# Patient Record
Sex: FEMALE | Race: WHITE | NOT HISPANIC OR LATINO | Employment: UNEMPLOYED | URBAN - METROPOLITAN AREA
[De-identification: names, ages, dates, MRNs, and addresses within clinical notes are randomized per-mention and may not be internally consistent; named-entity substitution may affect disease eponyms.]

---

## 2022-04-07 ENCOUNTER — TELEPHONE (OUTPATIENT)
Dept: OBSTETRICS AND GYNECOLOGY | Facility: CLINIC | Age: 27
End: 2022-04-07
Payer: MEDICAID

## 2022-04-07 NOTE — TELEPHONE ENCOUNTER
Spoke with patient and informed her that we are not accepting new OBs due to Dr. Casillas moving in June, patient verbalized understanding and states she will call around.   Never smoker

## 2022-05-09 ENCOUNTER — OFFICE VISIT (OUTPATIENT)
Dept: OBSTETRICS AND GYNECOLOGY | Facility: CLINIC | Age: 27
End: 2022-05-09
Payer: MEDICAID

## 2022-05-09 VITALS
SYSTOLIC BLOOD PRESSURE: 124 MMHG | BODY MASS INDEX: 16.63 KG/M2 | DIASTOLIC BLOOD PRESSURE: 80 MMHG | WEIGHT: 99.81 LBS | HEART RATE: 88 BPM | HEIGHT: 65 IN

## 2022-05-09 DIAGNOSIS — Z32.01 POSITIVE PREGNANCY TEST: ICD-10-CM

## 2022-05-09 DIAGNOSIS — Z11.3 SCREENING EXAMINATION FOR SEXUALLY TRANSMITTED DISEASE: ICD-10-CM

## 2022-05-09 DIAGNOSIS — N92.6 LATE MENSES: Primary | ICD-10-CM

## 2022-05-09 LAB
B-HCG UR QL: POSITIVE
CTP QC/QA: YES

## 2022-05-09 PROCEDURE — 1160F RVW MEDS BY RX/DR IN RCRD: CPT | Mod: CPTII,S$GLB,, | Performed by: NURSE PRACTITIONER

## 2022-05-09 PROCEDURE — 3008F PR BODY MASS INDEX (BMI) DOCUMENTED: ICD-10-PCS | Mod: CPTII,S$GLB,, | Performed by: NURSE PRACTITIONER

## 2022-05-09 PROCEDURE — 81025 URINE PREGNANCY TEST: CPT | Mod: S$GLB,,, | Performed by: NURSE PRACTITIONER

## 2022-05-09 PROCEDURE — 3079F DIAST BP 80-89 MM HG: CPT | Mod: CPTII,S$GLB,, | Performed by: NURSE PRACTITIONER

## 2022-05-09 PROCEDURE — 3074F SYST BP LT 130 MM HG: CPT | Mod: CPTII,S$GLB,, | Performed by: NURSE PRACTITIONER

## 2022-05-09 PROCEDURE — 3079F PR MOST RECENT DIASTOLIC BLOOD PRESSURE 80-89 MM HG: ICD-10-PCS | Mod: CPTII,S$GLB,, | Performed by: NURSE PRACTITIONER

## 2022-05-09 PROCEDURE — 99213 OFFICE O/P EST LOW 20 MIN: CPT | Mod: 25,TH,S$GLB, | Performed by: NURSE PRACTITIONER

## 2022-05-09 PROCEDURE — 3074F PR MOST RECENT SYSTOLIC BLOOD PRESSURE < 130 MM HG: ICD-10-PCS | Mod: CPTII,S$GLB,, | Performed by: NURSE PRACTITIONER

## 2022-05-09 PROCEDURE — 3008F BODY MASS INDEX DOCD: CPT | Mod: CPTII,S$GLB,, | Performed by: NURSE PRACTITIONER

## 2022-05-09 PROCEDURE — 81025 POCT URINE PREGNANCY: ICD-10-PCS | Mod: S$GLB,,, | Performed by: NURSE PRACTITIONER

## 2022-05-09 PROCEDURE — 99213 PR OFFICE/OUTPT VISIT, EST, LEVL III, 20-29 MIN: ICD-10-PCS | Mod: 25,TH,S$GLB, | Performed by: NURSE PRACTITIONER

## 2022-05-09 PROCEDURE — 1159F PR MEDICATION LIST DOCUMENTED IN MEDICAL RECORD: ICD-10-PCS | Mod: CPTII,S$GLB,, | Performed by: NURSE PRACTITIONER

## 2022-05-09 PROCEDURE — 1160F PR REVIEW ALL MEDS BY PRESCRIBER/CLIN PHARMACIST DOCUMENTED: ICD-10-PCS | Mod: CPTII,S$GLB,, | Performed by: NURSE PRACTITIONER

## 2022-05-09 PROCEDURE — 1159F MED LIST DOCD IN RCRD: CPT | Mod: CPTII,S$GLB,, | Performed by: NURSE PRACTITIONER

## 2022-05-09 RX ORDER — PNV 112/IRON/FOLIC/OM3/DHA/EPA 3.33-.33MG
3 TABLET,CHEWABLE ORAL DAILY
Qty: 90 TABLET | Refills: 11 | Status: SHIPPED | OUTPATIENT
Start: 2022-05-09 | End: 2022-06-07 | Stop reason: SDUPTHER

## 2022-05-09 NOTE — PROGRESS NOTES
"    Subjective:       Patient ID: AGATA Branch is a 27 y.o. female.    Chief Complaint:  Possible Pregnancy      History of Present Illness   Presents for possible pregnancy.  +UPT today  EDC by LMP 22 making her approximately 14+5 reports   + UPT in April.  +THC, but is stopping with pregnancy   with history of NVD x1 uncomplicated and 1 early SAB    OB History        3    Para   1    Term   1            AB   1    Living   1       SAB        IAB        Ectopic        Multiple        Live Births   1                  Review of Systems  Review of Systems   Constitutional: Negative for chills and fever.   Respiratory: Negative for shortness of breath.    Cardiovascular: Negative for chest pain.   Gastrointestinal: Negative for abdominal pain, blood in stool, constipation, diarrhea, nausea, vomiting and reflux.   Genitourinary: Negative for dysmenorrhea, dyspareunia, dysuria, hematuria, hot flashes, menorrhagia, menstrual problem, pelvic pain, vaginal bleeding, vaginal discharge, postcoital bleeding and vaginal dryness.   Musculoskeletal: Negative for arthralgias and joint swelling.   Integumentary:  Negative for rash, hair changes, breast mass, nipple discharge and breast skin changes.   Psychiatric/Behavioral: Negative for depression. The patient is not nervous/anxious.    Breast: Negative for asymmetry, lump, mass, nipple discharge and skin changes          Objective:     Vitals:    22 1415   BP: 124/80   Pulse: (!) 132   Weight: 45.3 kg (99 lb 12.8 oz)   Height: 5' 5" (1.651 m)        Physical Exam:   Constitutional: She is oriented to person, place, and time. She appears well-developed. She appears cachectic.    HENT:   Head: Normocephalic and atraumatic.    Eyes: EOM are normal.     Cardiovascular: Normal rate, regular rhythm and normal heart sounds.     Pulmonary/Chest: Effort normal and breath sounds normal.        Abdominal: Soft.             Musculoskeletal: Moves all extremeties. "       Neurological: She is alert and oriented to person, place, and time.    Skin: Skin is warm and dry.    Psychiatric: She has a normal mood and affect. Her behavior is normal. Judgment and thought content normal.          Assessment:     1. Late menses    2. Positive pregnancy test    3. Screening examination for sexually transmitted disease              Plan:       Late menses  -     POCT URINE PREGNANCY    Positive pregnancy test  -     -iron-FA-om-3s-dha-epa (VITAFOL GUMMIES) 3.33 mg iron- 0.33 mg Chew; Take 3 tablets by mouth once daily at 6am.  Dispense: 90 tablet; Refill: 11    Screening examination for sexually transmitted disease  -     C. trachomatis/N. gonorrhoeae by AMP DNA [facility]; Urine       THC cessation discussed  Encouraged prenatal vitamin  Pain/bleeding/fever precautions  Discussed healthy diet, lifestyle, and activity in pregnancy  New ob request to Dr Medel/Jg's office   Follow up in about 4 weeks (around 6/6/2022).

## 2022-05-11 LAB
CHLAMYDIA: NEGATIVE
GONORRHEA: NEGATIVE
SOURCE: NORMAL

## 2022-05-17 DIAGNOSIS — Z34.91 FIRST TRIMESTER PREGNANCY: Primary | ICD-10-CM

## 2022-05-18 ENCOUNTER — PROCEDURE VISIT (OUTPATIENT)
Dept: OBSTETRICS AND GYNECOLOGY | Facility: CLINIC | Age: 27
End: 2022-05-18
Payer: MEDICAID

## 2022-05-18 DIAGNOSIS — Z34.91 FIRST TRIMESTER PREGNANCY: ICD-10-CM

## 2022-05-18 PROBLEM — F12.10 TETRAHYDROCANNABINOL (THC) USE DISORDER, MILD, ABUSE: Status: ACTIVE | Noted: 2022-05-18

## 2022-05-18 LAB
AMPHETAMINES (500): NEGATIVE NG/ML
BARBITURATES (200): NEGATIVE NG/ML
BENZODIAZEPINES: NEGATIVE NG/ML
COCAINE (150): NEGATIVE NG/ML
MARIJUANA QUANTITATION: >300 NG/ML (ref 0–50)
MARIJUANA, THC (50): ABNORMAL NG/ML
METHADONE: NEGATIVE NG/ML
OPIATES: NEGATIVE NG/ML
OXYCODONE: NEGATIVE NG/ML
PH: 8 (ref 4.5–8)
PHENCYCLIDINE (25): NEGATIVE NG/ML
URINE CREATININE D/S: 49 MG/DL

## 2022-05-18 PROCEDURE — 76801 US OB/GYN PROCEDURE (VIEWPOINT): ICD-10-PCS | Mod: S$GLB,,, | Performed by: STUDENT IN AN ORGANIZED HEALTH CARE EDUCATION/TRAINING PROGRAM

## 2022-05-18 PROCEDURE — 76801 OB US < 14 WKS SINGLE FETUS: CPT | Mod: S$GLB,,, | Performed by: STUDENT IN AN ORGANIZED HEALTH CARE EDUCATION/TRAINING PROGRAM

## 2022-06-07 ENCOUNTER — ROUTINE PRENATAL (OUTPATIENT)
Dept: OBSTETRICS AND GYNECOLOGY | Facility: CLINIC | Age: 27
End: 2022-06-07
Payer: MEDICAID

## 2022-06-07 VITALS
WEIGHT: 98.38 LBS | HEART RATE: 98 BPM | SYSTOLIC BLOOD PRESSURE: 116 MMHG | BODY MASS INDEX: 16.37 KG/M2 | DIASTOLIC BLOOD PRESSURE: 74 MMHG

## 2022-06-07 DIAGNOSIS — Z32.01 POSITIVE PREGNANCY TEST: ICD-10-CM

## 2022-06-07 DIAGNOSIS — J30.1 ALLERGIC RHINITIS DUE TO POLLEN, UNSPECIFIED SEASONALITY: ICD-10-CM

## 2022-06-07 DIAGNOSIS — R35.0 URINARY FREQUENCY: ICD-10-CM

## 2022-06-07 DIAGNOSIS — Z34.82 ENCOUNTER FOR SUPERVISION OF NORMAL PREGNANCY IN MULTIGRAVIDA IN SECOND TRIMESTER: Primary | ICD-10-CM

## 2022-06-07 DIAGNOSIS — F41.9 ANXIETY: ICD-10-CM

## 2022-06-07 LAB
AMORPH URATE CRY URNS QL MICRO: ABNORMAL
BACTERIA #/AREA URNS HPF: ABNORMAL /[HPF]
BILIRUB UR QL STRIP: NEGATIVE
CALCIUM OXALATE: ABNORMAL
CLARITY UR: CLEAR
COLOR UR: YELLOW
EPITHELIAL CELLS: ABNORMAL
GLUCOSE (UA): NEGATIVE MG/DL
KETONES UR QL STRIP: ABNORMAL MG/DL
LEUKOCYTE ESTERASE UR QL STRIP: NEGATIVE
MUCOUS THREADS URNS QL MICRO: ABNORMAL
NITRITE UR QL STRIP: NEGATIVE
OCCULT BLOOD: NEGATIVE
PH, URINE: 6 (ref 5–7.5)
PROT UR QL STRIP: NEGATIVE MG/DL
RBC/HPF: NEGATIVE
SP GR UR STRIP: 1.02 (ref 1–1.03)
UROBILINOGEN, URINE: NORMAL E.U./DL (ref 0–1)
WBC/HPF: ABNORMAL

## 2022-06-07 PROCEDURE — 99213 OFFICE O/P EST LOW 20 MIN: CPT | Mod: TH,S$GLB,, | Performed by: NURSE PRACTITIONER

## 2022-06-07 PROCEDURE — 99213 PR OFFICE/OUTPT VISIT, EST, LEVL III, 20-29 MIN: ICD-10-PCS | Mod: TH,S$GLB,, | Performed by: NURSE PRACTITIONER

## 2022-06-07 RX ORDER — PNV 112/IRON/FOLIC/OM3/DHA/EPA 3.33-.33MG
3 TABLET,CHEWABLE ORAL DAILY
Qty: 90 TABLET | Refills: 11 | Status: SHIPPED | OUTPATIENT
Start: 2022-06-07 | End: 2022-10-20 | Stop reason: SDUPTHER

## 2022-06-07 RX ORDER — FLUTICASONE PROPIONATE 50 MCG
1 SPRAY, SUSPENSION (ML) NASAL DAILY
Qty: 15.8 ML | Refills: 1 | Status: SHIPPED | OUTPATIENT
Start: 2022-06-07

## 2022-06-07 NOTE — PROGRESS NOTES
Subjective:       Patient ID: AGATA Branch is a 27 y.o.  at 14w1d   Chief Complaint:  Routine Prenatal Visit      History of Present Illness  Presents for initial prenatal visit.  Labs and history were reviewed with the patient today   with history of NVD x1 uncomplicated and 1 early SAB  Complains of allergies and anxiety. No SI/HI  Reviewed OBUS +IU with +FHTs EDC by u/s 22  Completely stopped cannabis   No PNLS done yet         History reviewed. No pertinent past medical history.    Past Surgical History:   Procedure Laterality Date    WISDOM TOOTH EXTRACTION         OB:    OB History    Para Term  AB Living   3 1 1   1 1   SAB IAB Ectopic Multiple Live Births           1      # Outcome Date GA Lbr Kevin/2nd Weight Sex Delivery Anes PTL Lv   3 Current            2 AB 21 4w0d          1 Term 16 38w0d  2.268 kg (5 lb) M Vag-Spont   PRIYA     Gyn: no STD, never had abn pap   Meds:   Current Outpatient Medications:     fluticasone propionate (FLONASE) 50 mcg/actuation nasal spray, 1 spray (50 mcg total) by Each Nostril route once daily., Disp: 15.8 mL, Rfl: 1    -iron-FA-om-3s-dha-epa (VITAFOL GUMMIES) 3.33 mg iron- 0.33 mg Chew, Take 3 tablets by mouth once daily at 6am., Disp: 90 tablet, Rfl: 11    All:   Review of patient's allergies indicates:   Allergen Reactions    Opioids - morphine analogues        SH:   Social History     Tobacco Use    Smoking status: Never Smoker    Smokeless tobacco: Never Used   Substance Use Topics    Alcohol use: Yes      FH: family history includes Diabetes in her brother and father; Hypertension in her mother; Liver cancer in her maternal grandmother; Lung cancer in her paternal grandfather; Skin cancer in her paternal grandfather.      Review of Systems  Review of Systems   Constitutional: Negative for chills and fever.   Respiratory: Negative for shortness of breath.    Cardiovascular: Negative for chest pain.    Gastrointestinal: Positive for nausea. Negative for abdominal pain, blood in stool, constipation, diarrhea, vomiting and reflux.   Genitourinary: Negative for dysmenorrhea, dyspareunia, dysuria, hematuria, hot flashes, menorrhagia, menstrual problem, pelvic pain, vaginal bleeding, vaginal discharge, postcoital bleeding and vaginal dryness.   Musculoskeletal: Negative for arthralgias and joint swelling.   Integumentary:  Negative for rash, hair changes, breast mass, nipple discharge and breast skin changes.   Psychiatric/Behavioral: Negative for depression. The patient is not nervous/anxious.    Breast: Negative for asymmetry, lump, mass, nipple discharge and skin changes        Objective:     Vitals:    06/07/22 1347   BP: 116/74   Pulse: 98   Weight: 44.6 kg (98 lb 6.4 oz)       Physical Exam:   Constitutional: She is oriented to person, place, and time. She appears well-developed and well-nourished.    HENT:   Head: Normocephalic and atraumatic.   Mouth/Throat: Uvula is midline.   +PND      Cardiovascular: Normal rate.     Pulmonary/Chest: Effort normal and breath sounds normal.        Abdominal: Soft.             Musculoskeletal: Moves all extremeties.       Neurological: She is alert and oriented to person, place, and time.    Skin: Skin is warm and dry.    Psychiatric: She has a normal mood and affect. Her behavior is normal. Judgment and thought content normal.             Assessment:     1. Encounter for supervision of normal pregnancy in multigravida in second trimester    2. Urinary frequency    3. Allergic rhinitis due to pollen, unspecified seasonality    4. Positive pregnancy test              Plan:     Encounter for supervision of normal pregnancy in multigravida in second trimester  -     PREQUEL® Prenatal Screen (NIPS); Future; Expected date: 06/07/2022    Urinary frequency  -     Urinalysis  -     Urine Culture High Risk    Allergic rhinitis due to pollen, unspecified seasonality  -     fluticasone  propionate (FLONASE) 50 mcg/actuation nasal spray; 1 spray (50 mcg total) by Each Nostril route once daily.  Dispense: 15.8 mL; Refill: 1    Positive pregnancy test  -     -iron-FA-om-3s-dha-epa (VITAFOL GUMMIES) 3.33 mg iron- 0.33 mg Chew; Take 3 tablets by mouth once daily at 6am.  Dispense: 90 tablet; Refill: 11          Needs prenatal labs  Declines meds for anxiety will refer for therapy  Pain fever bleeding precautions  Encouraged PNV  rtc 4 wks

## 2022-06-08 LAB — URINE CULTURE, ROUTINE: NORMAL

## 2022-06-09 ENCOUNTER — PATIENT MESSAGE (OUTPATIENT)
Dept: OBSTETRICS AND GYNECOLOGY | Facility: CLINIC | Age: 27
End: 2022-06-09
Payer: MEDICAID

## 2022-06-09 DIAGNOSIS — O23.40 URINARY TRACT INFECTION IN MOTHER DURING PREGNANCY, ANTEPARTUM: Primary | ICD-10-CM

## 2022-06-09 RX ORDER — CEPHALEXIN 500 MG/1
500 CAPSULE ORAL 3 TIMES DAILY
Qty: 21 CAPSULE | Refills: 0 | Status: SHIPPED | OUTPATIENT
Start: 2022-06-09

## 2022-06-13 LAB
ABS NRBC COUNT: 0 X 10 3/UL (ref 0–0.01)
ABSOLUTE BASOPHIL: 0.05 X 10 3/UL (ref 0–0.22)
ABSOLUTE EOSINOPHIL: 0.08 X 10 3/UL (ref 0.04–0.54)
ABSOLUTE IMMATURE GRAN: 0.06 X 10 3/UL (ref 0–0.04)
ABSOLUTE LYMPHOCYTE: 2.12 X 10 3/UL (ref 0.86–4.75)
ABSOLUTE MONOCYTE: 0.45 X 10 3/UL (ref 0.22–1.08)
ANTIBODY SCREEN: NEGATIVE
BASOPHILS NFR BLD: 0.5 % (ref 0.2–1.2)
BLOOD GROUPING: NORMAL
BLOOD TYPE (D): POSITIVE
EOSINOPHIL NFR BLD: 0.8 % (ref 0.7–7)
HBV SURFACE AG SERPL QL IA: NONREACTIVE
HCT VFR BLD AUTO: 38.4 % (ref 37–47)
HCV IGG SERPL QL IA: NONREACTIVE
HGB BLD-MCNC: 13.1 G/DL (ref 12–16)
HIV 1+2 AB+HIV1 P24 AG SERPL QL IA: NONREACTIVE
IMMATURE GRANULOCYTES: 0.6 % (ref 0–0.5)
LYMPHOCYTES NFR BLD: 21.7 % (ref 19.3–53.1)
MCH RBC QN AUTO: 31.7 PG (ref 27–32)
MCHC RBC AUTO-ENTMCNC: 34.1 G/DL (ref 32–36)
MCV RBC AUTO: 93 FL (ref 82–100)
MONOCYTES NFR BLD: 4.6 % (ref 4.7–12.5)
NEUTROPHILS # BLD AUTO: 7.01 X 10 3/UL (ref 2.15–7.56)
NEUTROPHILS NFR BLD: 71.8 % (ref 34–71.1)
NUCLEATED RED BLOOD CELLS: 0 /100 WBC (ref 0–0.2)
PLATELET # BLD AUTO: 184 X 10 3/UL (ref 135–400)
RBC # BLD AUTO: 4.13 X 10 6/UL (ref 4.2–5.4)
RDW-SD: 42.9 FL (ref 37–54)
RUBELLA IGG SCREEN: NORMAL
SICKLE CELL PREP: NEGATIVE
SYPHILIS TREPONEMAL ANTIBODY: NONREACTIVE
WBC # BLD: 9.77 X 10 3/UL (ref 4.3–10.8)

## 2022-06-24 ENCOUNTER — PATIENT MESSAGE (OUTPATIENT)
Dept: OBSTETRICS AND GYNECOLOGY | Facility: CLINIC | Age: 27
End: 2022-06-24
Payer: MEDICAID

## 2022-07-05 ENCOUNTER — ROUTINE PRENATAL (OUTPATIENT)
Dept: OBSTETRICS AND GYNECOLOGY | Facility: CLINIC | Age: 27
End: 2022-07-05
Payer: MEDICAID

## 2022-07-05 VITALS
DIASTOLIC BLOOD PRESSURE: 81 MMHG | BODY MASS INDEX: 16.91 KG/M2 | WEIGHT: 101.63 LBS | SYSTOLIC BLOOD PRESSURE: 118 MMHG

## 2022-07-05 DIAGNOSIS — Z34.82 ENCOUNTER FOR SUPERVISION OF NORMAL PREGNANCY IN MULTIGRAVIDA IN SECOND TRIMESTER: Primary | ICD-10-CM

## 2022-07-05 DIAGNOSIS — Z36.89 ENCOUNTER FOR FETAL ANATOMIC SURVEY: ICD-10-CM

## 2022-07-05 PROCEDURE — 99213 PR OFFICE/OUTPT VISIT, EST, LEVL III, 20-29 MIN: ICD-10-PCS | Mod: TH,S$GLB,, | Performed by: NURSE PRACTITIONER

## 2022-07-05 PROCEDURE — 99213 OFFICE O/P EST LOW 20 MIN: CPT | Mod: TH,S$GLB,, | Performed by: NURSE PRACTITIONER

## 2022-07-05 NOTE — PROGRESS NOTES
Subjective:       Patient ID: AGATA Branch is a 27 y.o.  at 18w1d      Chief Complaint:  Routine Prenatal Visit      History of Present Illness  No complaints. Labs and history reviewed with pt.   States her anxiety has improved with cessation of cannabis     Review of Systems  Denies n/v, f/c, dysuria, contractions,   VD, VB, round ligament pain, headaches      OB History        3    Para   1    Term   1            AB   1    Living   1       SAB        IAB        Ectopic        Multiple        Live Births   1                   Objective:     Vitals:    22 1447   BP: 118/81     Wt Readings from Last 3 Encounters:   22 46.1 kg (101 lb 9.6 oz)   22 44.6 kg (98 lb 6.4 oz)   22 45.3 kg (99 lb 12.8 oz)        nad  NCAT  pupils normal size  Skin nml no rashes or lesions  No resp distress, resp even and unlabored  Gravid nt, no rebound no guarding  No cyanosis or clubbing, edema appropriate for pregn  FH AGA  FHT: 150s       Assessment:        1. Encounter for supervision of normal pregnancy in multigravida in second trimester    2. Encounter for fetal anatomic survey                Plan:        Encounter for supervision of normal pregnancy in multigravida in second trimester    Encounter for fetal anatomic survey  -     US OB/GYN Procedure ([facility]) - Extended List; Future           Declines MSAFP  Declines pap, prefers to wait until PP   Encouraged PNV  Pain, fever, bleeding precautions   Follow up in about 4 weeks (around 2022).

## 2022-08-02 ENCOUNTER — ROUTINE PRENATAL (OUTPATIENT)
Dept: OBSTETRICS AND GYNECOLOGY | Facility: CLINIC | Age: 27
End: 2022-08-02
Payer: MEDICAID

## 2022-08-02 VITALS
DIASTOLIC BLOOD PRESSURE: 72 MMHG | WEIGHT: 108 LBS | BODY MASS INDEX: 17.97 KG/M2 | HEART RATE: 92 BPM | SYSTOLIC BLOOD PRESSURE: 112 MMHG

## 2022-08-02 DIAGNOSIS — Z34.82 ENCOUNTER FOR SUPERVISION OF NORMAL PREGNANCY IN MULTIGRAVIDA IN SECOND TRIMESTER: Primary | ICD-10-CM

## 2022-08-02 PROCEDURE — 99213 OFFICE O/P EST LOW 20 MIN: CPT | Mod: TH,S$GLB,, | Performed by: NURSE PRACTITIONER

## 2022-08-02 PROCEDURE — 99213 PR OFFICE/OUTPT VISIT, EST, LEVL III, 20-29 MIN: ICD-10-PCS | Mod: TH,S$GLB,, | Performed by: NURSE PRACTITIONER

## 2022-08-02 NOTE — PROGRESS NOTES
CC: Follow-Up OB    HPI:   27 y.o.  at 22w1d with EDC of 2022, by Ultrasound, here for her follow up OB visit.  Denies any complaints.    Pregnancy ROS:  Positive fetal movement   Negative leakage of fluid   Negative vaginal bleeding   Negative headache, vision changes, RUQ pain, epigastric pain  Negative contractions/abdominal pain   Negative pelvic pressure     Physical Exam:  Prenatal Vitals  BP: 112/72  Weight: 49 kg (108 lb)  Fetal Heart Rate: 150s    Wt Readings from Last 3 Encounters:   22 49 kg (108 lb)   22 46.1 kg (101 lb 9.6 oz)   22 44.6 kg (98 lb 6.4 oz)       Body mass index is 17.97 kg/m².    General: NAD, well developed, well nourished  Psych: alert and oriented to person, time and place, normal affect  HEENT: normocephalic, atraumatic  Abd: Gravid, soft, NT, ND  Skin: warm, dry  Neuro: normal gait, gross motor function intact  No cyanosis, clubbing or edema     FHTs: 150s  FH: AGA    Maternal Blood Type: O+    ASSESSMENT: 27 y.o.  @ 22w1d with   1. Encounter for supervision of normal pregnancy in multigravida in second trimester         PLAN:  Encounter for supervision of normal pregnancy in multigravida in second trimester       Pain, fever, bleeding precautions  Labor, Fetal movement, and Preeclampsia precautions  Cont PNV  KA for anatomy scan  Return to clinic in 4 weeks

## 2022-08-09 ENCOUNTER — PROCEDURE VISIT (OUTPATIENT)
Dept: OBSTETRICS AND GYNECOLOGY | Facility: CLINIC | Age: 27
End: 2022-08-09
Payer: MEDICAID

## 2022-08-09 DIAGNOSIS — Z36.89 ENCOUNTER FOR FETAL ANATOMIC SURVEY: ICD-10-CM

## 2022-08-09 PROCEDURE — 76805 OB US >/= 14 WKS SNGL FETUS: CPT | Mod: S$GLB,,, | Performed by: STUDENT IN AN ORGANIZED HEALTH CARE EDUCATION/TRAINING PROGRAM

## 2022-08-09 PROCEDURE — 76805 US OB/GYN EXTENDED PROCEDURE (VIEWPOINT): ICD-10-PCS | Mod: S$GLB,,, | Performed by: STUDENT IN AN ORGANIZED HEALTH CARE EDUCATION/TRAINING PROGRAM

## 2022-08-20 ENCOUNTER — PATIENT MESSAGE (OUTPATIENT)
Dept: OBSTETRICS AND GYNECOLOGY | Facility: CLINIC | Age: 27
End: 2022-08-20
Payer: MEDICAID

## 2022-08-30 ENCOUNTER — ROUTINE PRENATAL (OUTPATIENT)
Dept: OBSTETRICS AND GYNECOLOGY | Facility: CLINIC | Age: 27
End: 2022-08-30
Payer: MEDICAID

## 2022-08-30 VITALS
BODY MASS INDEX: 19.3 KG/M2 | WEIGHT: 116 LBS | SYSTOLIC BLOOD PRESSURE: 122 MMHG | HEART RATE: 95 BPM | DIASTOLIC BLOOD PRESSURE: 82 MMHG

## 2022-08-30 DIAGNOSIS — Z3A.26 26 WEEKS GESTATION OF PREGNANCY: ICD-10-CM

## 2022-08-30 DIAGNOSIS — Z34.82 ENCOUNTER FOR SUPERVISION OF OTHER NORMAL PREGNANCY IN SECOND TRIMESTER: Primary | ICD-10-CM

## 2022-08-30 DIAGNOSIS — Z34.92 SECOND TRIMESTER PREGNANCY: ICD-10-CM

## 2022-08-30 LAB — GLUCOSE 1 HR POST 50 GM: 126 MG/DL

## 2022-08-30 PROCEDURE — 99213 OFFICE O/P EST LOW 20 MIN: CPT | Mod: TH,S$GLB,, | Performed by: STUDENT IN AN ORGANIZED HEALTH CARE EDUCATION/TRAINING PROGRAM

## 2022-08-30 PROCEDURE — 99213 PR OFFICE/OUTPT VISIT, EST, LEVL III, 20-29 MIN: ICD-10-PCS | Mod: TH,S$GLB,, | Performed by: STUDENT IN AN ORGANIZED HEALTH CARE EDUCATION/TRAINING PROGRAM

## 2022-08-30 NOTE — PROGRESS NOTES
CC: Follow-Up OB    HPI:   27 y.o.  at 26w1d with EDC of 2022, by 11w Ultrasound, here for her follow up OB visit. Complains of nothing today. PMH: denies, PSH: wisdom teeth, SH: THC use, OBHx:  and SAB, GYNhx: denies, FH: non contributory, All: morphine - not true allergy.     Pregnancy ROS:  Positive fetal movement   Negative leakage of fluid   Negative vaginal bleeding   Negative headache, vision changes, RUQ pain, epigastric pain  Negative contractions/abdominal pain   Negative pelvic pressure     Physical Exam:  Prenatal Vitals  BP: 122/82  Weight: 52.6 kg (116 lb)  Urine Glucose: Negative  Urine Albumin: Negative    Wt Readings from Last 3 Encounters:   22 52.6 kg (116 lb)   22 49 kg (108 lb)   22 46.1 kg (101 lb 9.6 oz)       Body mass index is 19.3 kg/m².    General: NAD, well developed, well nourished  Psych: alert and oriented to person, time and place, normal affect  HEENT: normocephalic, atraumatic  Abd: Gravid, soft, NT, ND  Skin: warm, dry  Neuro: normal gait, gross motor function intact    No cyanosis, clubbing or edema    FHTs: 150's    Maternal Blood Type: O+/-    ASSESSMENT: 27 y.o.  @ 26w1d with   Patient Active Problem List   Diagnosis    Tetrahydrocannabinol (THC) use disorder, mild, abuse    Encounter for supervision of normal pregnancy in second trimester     PLAN:  PNL reviewed  Osull today  3rd trimester labs and tdap on RTC   Counseled on THC cessation, pt reports stopping   Pain, fever, bleeding precautions  TORI, FARHAT, PreE precautions  Cont PNV, Iron  Return to clinic in 3 weeks

## 2022-09-27 ENCOUNTER — ROUTINE PRENATAL (OUTPATIENT)
Dept: OBSTETRICS AND GYNECOLOGY | Facility: CLINIC | Age: 27
End: 2022-09-27
Payer: MEDICAID

## 2022-09-27 VITALS
SYSTOLIC BLOOD PRESSURE: 126 MMHG | WEIGHT: 125.38 LBS | BODY MASS INDEX: 20.86 KG/M2 | DIASTOLIC BLOOD PRESSURE: 78 MMHG | HEART RATE: 111 BPM

## 2022-09-27 DIAGNOSIS — K21.9 GASTROESOPHAGEAL REFLUX IN PREGNANCY: ICD-10-CM

## 2022-09-27 DIAGNOSIS — Z34.83 ENCOUNTER FOR SUPERVISION OF NORMAL PREGNANCY IN MULTIGRAVIDA IN THIRD TRIMESTER: Primary | ICD-10-CM

## 2022-09-27 DIAGNOSIS — O99.619 GASTROESOPHAGEAL REFLUX IN PREGNANCY: ICD-10-CM

## 2022-09-27 PROCEDURE — 99213 PR OFFICE/OUTPT VISIT, EST, LEVL III, 20-29 MIN: ICD-10-PCS | Mod: 25,TH,S$GLB, | Performed by: NURSE PRACTITIONER

## 2022-09-27 PROCEDURE — 90471 TDAP VACCINE GREATER THAN OR EQUAL TO 7YO IM: ICD-10-PCS | Mod: S$GLB,,, | Performed by: NURSE PRACTITIONER

## 2022-09-27 PROCEDURE — 90715 TDAP VACCINE GREATER THAN OR EQUAL TO 7YO IM: ICD-10-PCS | Mod: S$GLB,,, | Performed by: NURSE PRACTITIONER

## 2022-09-27 PROCEDURE — 90715 TDAP VACCINE 7 YRS/> IM: CPT | Mod: S$GLB,,, | Performed by: NURSE PRACTITIONER

## 2022-09-27 PROCEDURE — 90471 IMMUNIZATION ADMIN: CPT | Mod: S$GLB,,, | Performed by: NURSE PRACTITIONER

## 2022-09-27 PROCEDURE — 99213 OFFICE O/P EST LOW 20 MIN: CPT | Mod: 25,TH,S$GLB, | Performed by: NURSE PRACTITIONER

## 2022-09-27 RX ORDER — FAMOTIDINE 20 MG/1
20 TABLET, FILM COATED ORAL 2 TIMES DAILY
Qty: 60 TABLET | Refills: 3 | Status: SHIPPED | OUTPATIENT
Start: 2022-09-27 | End: 2023-09-27

## 2022-09-27 NOTE — PROGRESS NOTES
CC: Follow-Up OB    HPI:   27 y.o.  at 30w1d with EDC of 2022, by Ultrasound, here for her follow up OB visit.  Complains of Reflux not relieved by TUMS.    Pregnancy ROS:  Positive fetal movement   Negative leakage of fluid   Negative vaginal bleeding   Negative headache, vision changes, RUQ pain, epigastric pain  Negative contractions/abdominal pain   Negative pelvic pressure     Physical Exam:  Prenatal Vitals  BP: 126/78  Weight: 56.9 kg (125 lb 6 oz)  Fetal Heart Rate: 150's    Wt Readings from Last 3 Encounters:   22 56.9 kg (125 lb 6 oz)   22 52.6 kg (116 lb)   22 49 kg (108 lb)       Body mass index is 20.86 kg/m².    General: NAD, well developed, well nourished  Psych: alert and oriented to person, time and place, normal affect  HEENT: normocephalic, atraumatic  Abd: Gravid, soft, NT, ND  Skin: warm, dry  Neuro: normal gait, gross motor function intact  No cyanosis, clubbing or edema    FHTs: 150s  FH: AGA    Maternal Blood Type: O+    ASSESSMENT: 27 y.o.  @ 30w1d with   1. Encounter for supervision of normal pregnancy in multigravida in third trimester    2. Gastroesophageal reflux in pregnancy         PLAN:  Encounter for supervision of normal pregnancy in multigravida in third trimester  -     Tdap Vaccine    Gastroesophageal reflux in pregnancy  -     famotidine (PEPCID) 20 MG tablet; Take 1 tablet (20 mg total) by mouth 2 (two) times daily.  Dispense: 60 tablet; Refill: 3       Pain, fever, bleeding precautions  Labor, Fetal movement, and Preeclampsia precautions  Cont PNV  [place] diet/ elevate HOB  Prenatal education discussed   Return to clinic in 2 weeks

## 2022-10-11 ENCOUNTER — ROUTINE PRENATAL (OUTPATIENT)
Dept: OBSTETRICS AND GYNECOLOGY | Facility: CLINIC | Age: 27
End: 2022-10-11
Payer: MEDICAID

## 2022-10-11 VITALS
WEIGHT: 124.13 LBS | HEART RATE: 109 BPM | SYSTOLIC BLOOD PRESSURE: 134 MMHG | DIASTOLIC BLOOD PRESSURE: 77 MMHG | BODY MASS INDEX: 20.66 KG/M2

## 2022-10-11 DIAGNOSIS — Z34.83 ENCOUNTER FOR SUPERVISION OF NORMAL PREGNANCY IN MULTIGRAVIDA IN THIRD TRIMESTER: Primary | ICD-10-CM

## 2022-10-11 PROCEDURE — 99213 PR OFFICE/OUTPT VISIT, EST, LEVL III, 20-29 MIN: ICD-10-PCS | Mod: TH,S$GLB,, | Performed by: NURSE PRACTITIONER

## 2022-10-11 PROCEDURE — 99213 OFFICE O/P EST LOW 20 MIN: CPT | Mod: TH,S$GLB,, | Performed by: NURSE PRACTITIONER

## 2022-10-11 NOTE — PROGRESS NOTES
CC: Follow-Up OB    HPI:   27 y.o.  at 32w1d with EDC of 2022, by Ultrasound, here for her follow up OB visit.  Denies any complaints.Reflux resolved with Pepcid     Pregnancy ROS:  Positive fetal movement   Negative leakage of fluid   Negative vaginal bleeding   Negative headache, vision changes, RUQ pain, epigastric pain  Negative contractions/abdominal pain   Negative pelvic pressure     Physical Exam:  Prenatal Vitals  BP: 134/77  Weight: 56.3 kg (124 lb 2 oz)  Fetal Heart Rate: 150's    Wt Readings from Last 3 Encounters:   10/11/22 56.3 kg (124 lb 2 oz)   22 56.9 kg (125 lb 6 oz)   22 52.6 kg (116 lb)       Body mass index is 20.66 kg/m².    General: NAD, well developed, well nourished  Psych: alert and oriented to person, time and place, normal affect  HEENT: normocephalic, atraumatic  Abd: Gravid, soft, NT, ND  Skin: warm, dry  Neuro: normal gait, gross motor function intact  No cyanosis, clubbing or edema    FHTs: 150s  FH: AGA    Maternal Blood Type: O+/-    ASSESSMENT: 27 y.o.  @ 32w1d with   1. Encounter for supervision of normal pregnancy in multigravida in third trimester         PLAN:  Encounter for supervision of normal pregnancy in multigravida in third trimester  -     BREAST PUMP FOR HOME USE       Pain, fever, bleeding precautions  Labor, Fetal movement, and Preeclampsia precautions  Cont PNV/pepcid   Return to clinic in 2 weeks

## 2022-10-20 DIAGNOSIS — Z32.01 POSITIVE PREGNANCY TEST: ICD-10-CM

## 2022-10-21 RX ORDER — PNV 112/IRON/FOLIC/OM3/DHA/EPA 3.33-.33MG
3 TABLET,CHEWABLE ORAL DAILY
Qty: 90 TABLET | Refills: 11 | Status: SHIPPED | OUTPATIENT
Start: 2022-10-21

## 2022-10-25 ENCOUNTER — ROUTINE PRENATAL (OUTPATIENT)
Dept: OBSTETRICS AND GYNECOLOGY | Facility: CLINIC | Age: 27
End: 2022-10-25
Payer: MEDICAID

## 2022-10-25 VITALS
BODY MASS INDEX: 21.63 KG/M2 | WEIGHT: 130 LBS | DIASTOLIC BLOOD PRESSURE: 73 MMHG | HEART RATE: 102 BPM | SYSTOLIC BLOOD PRESSURE: 109 MMHG

## 2022-10-25 DIAGNOSIS — Z34.83 ENCOUNTER FOR SUPERVISION OF NORMAL PREGNANCY IN MULTIGRAVIDA IN THIRD TRIMESTER: Primary | ICD-10-CM

## 2022-10-25 PROCEDURE — 99213 PR OFFICE/OUTPT VISIT, EST, LEVL III, 20-29 MIN: ICD-10-PCS | Mod: TH,S$GLB,, | Performed by: NURSE PRACTITIONER

## 2022-10-25 PROCEDURE — 99213 OFFICE O/P EST LOW 20 MIN: CPT | Mod: TH,S$GLB,, | Performed by: NURSE PRACTITIONER

## 2022-10-25 NOTE — PROGRESS NOTES
CC: Follow-Up OB    HPI:   27 y.o.  at 34w1d with EDC of 2022, by Ultrasound, here for her follow up OB visit.  Complains of upper respiratory ssx that started . No fever or body aches     Pregnancy ROS:  Positive fetal movement   Negative leakage of fluid   Negative vaginal bleeding   Negative headache, vision changes, RUQ pain, epigastric pain  Negative contractions/abdominal pain   Negative pelvic pressure     Physical Exam:  Prenatal Vitals  BP: 109/73  Weight: 59 kg (130 lb)  Fetal Heart Rate: 130s    Wt Readings from Last 3 Encounters:   10/25/22 59 kg (130 lb)   10/11/22 56.3 kg (124 lb 2 oz)   22 56.9 kg (125 lb 6 oz)       Body mass index is 21.63 kg/m².    General: NAD, well developed, well nourished  Psych: alert and oriented to person, time and place, normal affect  HEENT: normocephalic, atraumatic Btm +serous fluid, +pnd,   Abd: Gravid, soft, NT, ND  Skin: warm, dry  Neuro: normal gait, gross motor function intact  No cyanosis, clubbing or edema    FHTs: 130s  FH: 32 cm     Maternal Blood Type: O+/-    ASSESSMENT: 27 y.o.  @ 34w1d with   1. Encounter for supervision of normal pregnancy in multigravida in third trimester         PLAN:  Encounter for supervision of normal pregnancy in multigravida in third trimester       Pain, fever, bleeding precautions  Labor, Fetal movement, and Preeclampsia precautions  Cont PNV  Flonase/tylenol prn. Call if ssx not resolved 7-10 days   Return to clinic in 2 weeks w/Dr Gregorio

## 2022-11-08 ENCOUNTER — ROUTINE PRENATAL (OUTPATIENT)
Dept: OBSTETRICS AND GYNECOLOGY | Facility: CLINIC | Age: 27
End: 2022-11-08
Payer: MEDICAID

## 2022-11-08 DIAGNOSIS — Z3A.36 36 WEEKS GESTATION OF PREGNANCY: ICD-10-CM

## 2022-11-08 DIAGNOSIS — Z34.83 ENCOUNTER FOR SUPERVISION OF OTHER NORMAL PREGNANCY IN THIRD TRIMESTER: Primary | ICD-10-CM

## 2022-11-08 PROCEDURE — 99213 PR OFFICE/OUTPT VISIT, EST, LEVL III, 20-29 MIN: ICD-10-PCS | Mod: TH,S$GLB,, | Performed by: STUDENT IN AN ORGANIZED HEALTH CARE EDUCATION/TRAINING PROGRAM

## 2022-11-08 PROCEDURE — 99213 OFFICE O/P EST LOW 20 MIN: CPT | Mod: TH,S$GLB,, | Performed by: STUDENT IN AN ORGANIZED HEALTH CARE EDUCATION/TRAINING PROGRAM

## 2022-11-08 NOTE — PROGRESS NOTES
CC: Follow-Up OB    HPI:   27 y.o.  at 36w1d with EDC of 2022, by 11w Ultrasound, here for her follow up OB visit. Complains of occasional ctx.    Pregnancy ROS:  Positive fetal movement   Negative leakage of fluid   Negative vaginal bleeding   Negative headache, vision changes, RUQ pain, epigastric pain  Negative contractions/abdominal pain   Negative pelvic pressure     Physical Exam:  Prenatal Vitals  BP: 127/82  Weight: 45.8 kg (101 lb)  Urine Glucose: Negative  Urine Albumin: Negative    Wt Readings from Last 3 Encounters:   22 45.8 kg (101 lb)   10/25/22 59 kg (130 lb)   10/11/22 56.3 kg (124 lb 2 oz)     Body mass index is 16.81 kg/m².    General: NAD, well developed, well nourished  Psych: alert and oriented to person, time and place, normal affect  HEENT: normocephalic, atraumatic  Abd: Gravid, soft, NT, ND  Skin: warm, dry  Neuro: normal gait, gross motor function intact  Pelvic: NEFG. Cvx 1-2//hi  No cyanosis, clubbing or edema    FHTs: +    Maternal Blood Type: O+/-    ASSESSMENT: 27 y.o.  @ 36w1d with   Patient Active Problem List   Diagnosis    Tetrahydrocannabinol (THC) use disorder, mild, abuse    Encounter for supervision of normal pregnancy in second trimester     PLAN:  GBS today  Discussed IOL today, pt declines today  Pain, fever, bleeding precautions  TORI, FARHAT, PreE precautions  Cont PNV, Iron  Return to clinic in 1 weeks

## 2022-11-09 LAB
GROUP B STREP MOLECULAR: POSITIVE
PENICILLIN ALLERGIC: NO

## 2022-11-10 PROBLEM — O99.820 GBS (GROUP B STREPTOCOCCUS CARRIER), +RV CULTURE, CURRENTLY PREGNANT: Status: ACTIVE | Noted: 2022-11-10

## 2022-11-15 ENCOUNTER — ROUTINE PRENATAL (OUTPATIENT)
Dept: OBSTETRICS AND GYNECOLOGY | Facility: CLINIC | Age: 27
End: 2022-11-15
Payer: MEDICAID

## 2022-11-15 VITALS
HEART RATE: 101 BPM | BODY MASS INDEX: 22 KG/M2 | SYSTOLIC BLOOD PRESSURE: 116 MMHG | BODY MASS INDEX: 22.34 KG/M2 | WEIGHT: 134.25 LBS | HEART RATE: 99 BPM | DIASTOLIC BLOOD PRESSURE: 76 MMHG | DIASTOLIC BLOOD PRESSURE: 82 MMHG | WEIGHT: 132.19 LBS | SYSTOLIC BLOOD PRESSURE: 127 MMHG

## 2022-11-15 DIAGNOSIS — O99.820 GBS (GROUP B STREPTOCOCCUS CARRIER), +RV CULTURE, CURRENTLY PREGNANT: ICD-10-CM

## 2022-11-15 DIAGNOSIS — Z34.83 ENCOUNTER FOR SUPERVISION OF NORMAL PREGNANCY IN MULTIGRAVIDA IN THIRD TRIMESTER: Primary | ICD-10-CM

## 2022-11-15 PROCEDURE — 99213 OFFICE O/P EST LOW 20 MIN: CPT | Mod: TH,S$GLB,, | Performed by: NURSE PRACTITIONER

## 2022-11-15 PROCEDURE — 99213 PR OFFICE/OUTPT VISIT, EST, LEVL III, 20-29 MIN: ICD-10-PCS | Mod: TH,S$GLB,, | Performed by: NURSE PRACTITIONER

## 2022-11-15 NOTE — PROGRESS NOTES
CC: Follow-Up OB    HPI:   27 y.o.  at 37w1d with EDC of 2022, by Ultrasound, here for her follow up OB visit.  Complains of occasional contx    Pregnancy ROS:  Positive fetal movement   Negative leakage of fluid   Negative vaginal bleeding   Negative headache, vision changes, RUQ pain, epigastric pain  Negative contractions/abdominal pain   Negative pelvic pressure     Physical Exam:  Prenatal Vitals  BP: 116/76  Weight: 60.9 kg (134 lb 4 oz)  Fetal Heart Rate: 140's    Wt Readings from Last 3 Encounters:   11/15/22 60.9 kg (134 lb 4 oz)   22 60 kg (132 lb 3.2 oz)   10/25/22 59 kg (130 lb)       Body mass index is 22.34 kg/m².    General: NAD, well developed, well nourished  Psych: alert and oriented to person, time and place, normal affect  HEENT: normocephalic, atraumatic  Abd: Gravid, soft, NT, ND  Skin: warm, dry  Neuro: normal gait, gross motor function intact  Cvx 1-2//thick/^/vtx  No cyanosis, clubbing or edema    FHTs: 140s   FH: AGA    Maternal Blood Type: O+/-    ASSESSMENT: 27 y.o.  @ 37w1d with   1. Encounter for supervision of normal pregnancy in multigravida in third trimester    2. GBS (group B Streptococcus carrier), +RV culture, currently pregnant         PLAN:  Encounter for supervision of normal pregnancy in multigravida in third trimester    GBS (group B Streptococcus carrier), +RV culture, currently pregnant       Pain, fever, bleeding precautions  Labor, Fetal movement, and Preeclampsia precautions  Cont PNV  Desires IOL. Will set up w/Dr Gregorio  Return to clinic in 1 week

## 2022-11-18 ENCOUNTER — PATIENT MESSAGE (OUTPATIENT)
Dept: OBSTETRICS AND GYNECOLOGY | Facility: CLINIC | Age: 27
End: 2022-11-18
Payer: MEDICAID

## 2022-11-22 ENCOUNTER — ROUTINE PRENATAL (OUTPATIENT)
Dept: OBSTETRICS AND GYNECOLOGY | Facility: CLINIC | Age: 27
End: 2022-11-22
Payer: MEDICAID

## 2022-11-22 VITALS
SYSTOLIC BLOOD PRESSURE: 110 MMHG | BODY MASS INDEX: 22.47 KG/M2 | DIASTOLIC BLOOD PRESSURE: 76 MMHG | WEIGHT: 135 LBS | HEART RATE: 114 BPM

## 2022-11-22 DIAGNOSIS — Z34.83 ENCOUNTER FOR SUPERVISION OF NORMAL PREGNANCY IN MULTIGRAVIDA IN THIRD TRIMESTER: Primary | ICD-10-CM

## 2022-11-22 DIAGNOSIS — O99.820 GBS (GROUP B STREPTOCOCCUS CARRIER), +RV CULTURE, CURRENTLY PREGNANT: ICD-10-CM

## 2022-11-22 PROCEDURE — 99213 PR OFFICE/OUTPT VISIT, EST, LEVL III, 20-29 MIN: ICD-10-PCS | Mod: TH,S$GLB,, | Performed by: NURSE PRACTITIONER

## 2022-11-22 PROCEDURE — 99213 OFFICE O/P EST LOW 20 MIN: CPT | Mod: TH,S$GLB,, | Performed by: NURSE PRACTITIONER

## 2022-11-22 NOTE — PROGRESS NOTES
CC: Follow-Up OB    HPI:   27 y.o.  at 38w1d with EDC of 2022, by Ultrasound, here for her follow up OB visit.  Denies any complaints.    Pregnancy ROS:  Positive fetal movement   Negative leakage of fluid   Negative vaginal bleeding   Negative headache, vision changes, RUQ pain, epigastric pain  Negative contractions/abdominal pain   Negative pelvic pressure     Physical Exam:  Prenatal Vitals  BP: 110/76  Weight: 61.2 kg (135 lb)  Fetal Heart Rate: 140's    Wt Readings from Last 3 Encounters:   22 61.2 kg (135 lb)   11/15/22 60.9 kg (134 lb 4 oz)   22 60 kg (132 lb 3.2 oz)       Body mass index is 22.47 kg/m².    General: NAD, well developed, well nourished  Psych: alert and oriented to person, time and place, normal affect  HEENT: normocephalic, atraumatic  Abd: Gravid, soft, NT, ND  Skin: warm, dry  Neuro: normal gait, gross motor function intact  No cyanosis, clubbing or edema    FHTs: 140s   FH: AGA    Maternal Blood Type: O+/-    ASSESSMENT: 27 y.o.  @ 38w1d with   1. Encounter for supervision of normal pregnancy in multigravida in third trimester    2. GBS (group B Streptococcus carrier), +RV culture, currently pregnant         PLAN:  Encounter for supervision of normal pregnancy in multigravida in third trimester    GBS (group B Streptococcus carrier), +RV culture, currently pregnant       Pain, fever, bleeding precautions  Labor, Fetal movement, and Preeclampsia precautions  Cont PNV  Return to clinic  PP

## 2022-11-29 ENCOUNTER — OUTSIDE PLACE OF SERVICE (OUTPATIENT)
Dept: OBSTETRICS AND GYNECOLOGY | Facility: CLINIC | Age: 27
End: 2022-11-29
Payer: MEDICAID

## 2022-11-29 PROCEDURE — 0502F PR SUBSEQUENT PRENATAL CARE: ICD-10-PCS | Mod: ,,, | Performed by: NURSE PRACTITIONER

## 2022-11-29 PROCEDURE — 59409 OBSTETRICAL CARE: CPT | Mod: GB,,, | Performed by: STUDENT IN AN ORGANIZED HEALTH CARE EDUCATION/TRAINING PROGRAM

## 2022-11-29 PROCEDURE — 59409 PR OBSTETRICAL CARE,VAG DELIV ONLY: ICD-10-PCS | Mod: GB,,, | Performed by: STUDENT IN AN ORGANIZED HEALTH CARE EDUCATION/TRAINING PROGRAM

## 2022-11-29 PROCEDURE — 0502F SUBSEQUENT PRENATAL CARE: CPT | Mod: ,,, | Performed by: NURSE PRACTITIONER

## 2022-11-30 PROCEDURE — 99231 PR SUBSEQUENT HOSPITAL CARE,LEVL I: ICD-10-PCS | Mod: ,,, | Performed by: STUDENT IN AN ORGANIZED HEALTH CARE EDUCATION/TRAINING PROGRAM

## 2022-11-30 PROCEDURE — 99231 SBSQ HOSP IP/OBS SF/LOW 25: CPT | Mod: ,,, | Performed by: STUDENT IN AN ORGANIZED HEALTH CARE EDUCATION/TRAINING PROGRAM

## 2022-12-01 PROCEDURE — 99238 PR HOSPITAL DISCHARGE DAY,<30 MIN: ICD-10-PCS | Mod: ,,, | Performed by: STUDENT IN AN ORGANIZED HEALTH CARE EDUCATION/TRAINING PROGRAM

## 2022-12-01 PROCEDURE — 99238 HOSP IP/OBS DSCHRG MGMT 30/<: CPT | Mod: ,,, | Performed by: STUDENT IN AN ORGANIZED HEALTH CARE EDUCATION/TRAINING PROGRAM

## 2022-12-02 ENCOUNTER — OUTSIDE PLACE OF SERVICE (OUTPATIENT)
Dept: OBSTETRICS AND GYNECOLOGY | Facility: CLINIC | Age: 27
End: 2022-12-02
Payer: MEDICAID

## 2022-12-02 PROCEDURE — 99499 UNLISTED E&M SERVICE: CPT | Mod: ,,, | Performed by: OBSTETRICS & GYNECOLOGY

## 2022-12-02 PROCEDURE — 99499 NO LOS: ICD-10-PCS | Mod: ,,, | Performed by: OBSTETRICS & GYNECOLOGY

## 2023-01-10 ENCOUNTER — POSTPARTUM VISIT (OUTPATIENT)
Dept: OBSTETRICS AND GYNECOLOGY | Facility: CLINIC | Age: 28
End: 2023-01-10
Payer: MEDICAID

## 2023-01-10 VITALS
BODY MASS INDEX: 20.24 KG/M2 | WEIGHT: 121.63 LBS | HEART RATE: 86 BPM | SYSTOLIC BLOOD PRESSURE: 119 MMHG | DIASTOLIC BLOOD PRESSURE: 77 MMHG

## 2023-01-10 PROBLEM — Z34.92 ENCOUNTER FOR SUPERVISION OF NORMAL PREGNANCY IN SECOND TRIMESTER: Status: RESOLVED | Noted: 2022-08-30 | Resolved: 2023-01-10

## 2023-01-10 PROBLEM — O99.820 GBS (GROUP B STREPTOCOCCUS CARRIER), +RV CULTURE, CURRENTLY PREGNANT: Status: RESOLVED | Noted: 2022-11-10 | Resolved: 2023-01-10

## 2023-01-10 PROCEDURE — 59430 PR CARE AFTER DELIVERY ONLY: ICD-10-PCS | Mod: S$GLB,,, | Performed by: STUDENT IN AN ORGANIZED HEALTH CARE EDUCATION/TRAINING PROGRAM

## 2023-01-10 RX ORDER — ACETAMINOPHEN AND CODEINE PHOSPHATE 120; 12 MG/5ML; MG/5ML
1 SOLUTION ORAL DAILY
Qty: 90 TABLET | Refills: 3 | Status: SHIPPED | OUTPATIENT
Start: 2023-01-10 | End: 2023-02-03 | Stop reason: SDUPTHER

## 2023-01-10 NOTE — PROGRESS NOTES
Patient is a 27-year-old white female status post vaginal delivery present clinic today for 6 week postpartum visit.  Patient doing well today.  She reports not having a cycle since her delivery.  She desires mini pill for contraception.  She is breast feeding without difficulty..  She denies VB/VD/dysuria/Denies any HA, vision changes, F/C, LE swelling. Denies any breast pain/soreness. Denies postpartum depression.     Vitals:    01/10/23 0905   BP: 119/77   Pulse: 86   Weight: 55.2 kg (121 lb 9.6 oz)     Body mass index is 20.24 kg/m².  Gen:WDWN in NAD  Normocephalic, atraumatic  Non labored breathing  Abd soft, NT/ND  Pelvic NFEG no lesions no discharge, Vaginal walls pink moist well rugated no lesions  Skin: Warm and dry  Ext no edema    Patient Active Problem List   Diagnosis    Tetrahydrocannabinol (THC) use disorder, mild, abuse     (spontaneous vaginal delivery)       Plan   patient doing well postpartum  Patient released from postpartum restrictions  Patient counseled on contraception, desires mini pill  RTC 3 months for follow up